# Patient Record
Sex: MALE | Race: WHITE | NOT HISPANIC OR LATINO | Employment: OTHER | URBAN - METROPOLITAN AREA
[De-identification: names, ages, dates, MRNs, and addresses within clinical notes are randomized per-mention and may not be internally consistent; named-entity substitution may affect disease eponyms.]

---

## 2024-08-09 ENCOUNTER — HOSPITAL ENCOUNTER (EMERGENCY)
Facility: HOSPITAL | Age: 54
Discharge: HOME | End: 2024-08-09
Attending: EMERGENCY MEDICINE | Admitting: EMERGENCY MEDICINE
Payer: COMMERCIAL

## 2024-08-09 ENCOUNTER — APPOINTMENT (OUTPATIENT)
Dept: RADIOLOGY | Facility: HOSPITAL | Age: 54
End: 2024-08-09
Payer: COMMERCIAL

## 2024-08-09 VITALS
BODY MASS INDEX: 29.52 KG/M2 | WEIGHT: 230 LBS | OXYGEN SATURATION: 96 % | DIASTOLIC BLOOD PRESSURE: 84 MMHG | HEART RATE: 62 BPM | RESPIRATION RATE: 18 BRPM | TEMPERATURE: 98.7 F | SYSTOLIC BLOOD PRESSURE: 162 MMHG | HEIGHT: 74 IN

## 2024-08-09 DIAGNOSIS — T14.8XXA OPEN FRACTURE: ICD-10-CM

## 2024-08-09 LAB
ABO/RH(D): NORMAL
ANION GAP SERPL CALCULATED.3IONS-SCNC: 11 MMOL/L (ref 7–15)
ANTIBODY SCREEN: NEGATIVE
BASOPHILS # BLD AUTO: 0.1 10E3/UL (ref 0–0.2)
BASOPHILS NFR BLD AUTO: 1 %
BUN SERPL-MCNC: 28.9 MG/DL (ref 6–20)
CALCIUM SERPL-MCNC: 8.8 MG/DL (ref 8.8–10.4)
CHLORIDE SERPL-SCNC: 105 MMOL/L (ref 98–107)
CREAT SERPL-MCNC: 1.13 MG/DL (ref 0.67–1.17)
EGFRCR SERPLBLD CKD-EPI 2021: 77 ML/MIN/1.73M2
EOSINOPHIL # BLD AUTO: 0.3 10E3/UL (ref 0–0.7)
EOSINOPHIL NFR BLD AUTO: 5 %
ERYTHROCYTE [DISTWIDTH] IN BLOOD BY AUTOMATED COUNT: 12.7 % (ref 10–15)
GLUCOSE SERPL-MCNC: 104 MG/DL (ref 70–99)
HCO3 SERPL-SCNC: 27 MMOL/L (ref 22–29)
HCT VFR BLD AUTO: 41.1 % (ref 40–53)
HGB BLD-MCNC: 14.7 G/DL (ref 13.3–17.7)
IMM GRANULOCYTES # BLD: 0 10E3/UL
IMM GRANULOCYTES NFR BLD: 0 %
LYMPHOCYTES # BLD AUTO: 1.8 10E3/UL (ref 0.8–5.3)
LYMPHOCYTES NFR BLD AUTO: 27 %
MCH RBC QN AUTO: 31.5 PG (ref 26.5–33)
MCHC RBC AUTO-ENTMCNC: 35.8 G/DL (ref 31.5–36.5)
MCV RBC AUTO: 88 FL (ref 78–100)
MONOCYTES # BLD AUTO: 0.4 10E3/UL (ref 0–1.3)
MONOCYTES NFR BLD AUTO: 5 %
NEUTROPHILS # BLD AUTO: 4.1 10E3/UL (ref 1.6–8.3)
NEUTROPHILS NFR BLD AUTO: 62 %
NRBC # BLD AUTO: 0 10E3/UL
NRBC BLD AUTO-RTO: 0 /100
PLATELET # BLD AUTO: 209 10E3/UL (ref 150–450)
POTASSIUM SERPL-SCNC: 3.8 MMOL/L (ref 3.4–5.3)
RBC # BLD AUTO: 4.66 10E6/UL (ref 4.4–5.9)
SODIUM SERPL-SCNC: 143 MMOL/L (ref 135–145)
SPECIMEN EXPIRATION DATE: NORMAL
WBC # BLD AUTO: 6.7 10E3/UL (ref 4–11)

## 2024-08-09 PROCEDURE — 96375 TX/PRO/DX INJ NEW DRUG ADDON: CPT

## 2024-08-09 PROCEDURE — 80048 BASIC METABOLIC PNL TOTAL CA: CPT

## 2024-08-09 PROCEDURE — 96376 TX/PRO/DX INJ SAME DRUG ADON: CPT

## 2024-08-09 PROCEDURE — 36415 COLL VENOUS BLD VENIPUNCTURE: CPT | Performed by: EMERGENCY MEDICINE

## 2024-08-09 PROCEDURE — 12004 RPR S/N/AX/GEN/TRK7.6-12.5CM: CPT

## 2024-08-09 PROCEDURE — 73130 X-RAY EXAM OF HAND: CPT | Mod: RT

## 2024-08-09 PROCEDURE — 99284 EMERGENCY DEPT VISIT MOD MDM: CPT | Mod: 25

## 2024-08-09 PROCEDURE — 90471 IMMUNIZATION ADMIN: CPT

## 2024-08-09 PROCEDURE — 96365 THER/PROPH/DIAG IV INF INIT: CPT

## 2024-08-09 PROCEDURE — 86900 BLOOD TYPING SEROLOGIC ABO: CPT | Performed by: EMERGENCY MEDICINE

## 2024-08-09 PROCEDURE — 85025 COMPLETE CBC W/AUTO DIFF WBC: CPT

## 2024-08-09 PROCEDURE — 26720 TREAT FINGER FRACTURE EACH: CPT | Mod: F7

## 2024-08-09 PROCEDURE — 36415 COLL VENOUS BLD VENIPUNCTURE: CPT

## 2024-08-09 PROCEDURE — 250N000011 HC RX IP 250 OP 636

## 2024-08-09 PROCEDURE — 90715 TDAP VACCINE 7 YRS/> IM: CPT

## 2024-08-09 RX ORDER — HYDROCODONE BITARTRATE AND ACETAMINOPHEN 5; 325 MG/1; MG/1
1 TABLET ORAL EVERY 6 HOURS PRN
Qty: 12 TABLET | Refills: 0 | Status: SHIPPED | OUTPATIENT
Start: 2024-08-09 | End: 2024-08-12

## 2024-08-09 RX ORDER — HYDROMORPHONE HYDROCHLORIDE 1 MG/ML
0.5 INJECTION, SOLUTION INTRAMUSCULAR; INTRAVENOUS; SUBCUTANEOUS ONCE
Status: COMPLETED | OUTPATIENT
Start: 2024-08-09 | End: 2024-08-09

## 2024-08-09 RX ORDER — CEFAZOLIN SODIUM 2 G/100ML
2 INJECTION, SOLUTION INTRAVENOUS ONCE
Status: COMPLETED | OUTPATIENT
Start: 2024-08-09 | End: 2024-08-09

## 2024-08-09 RX ORDER — CEPHALEXIN 500 MG/1
500 CAPSULE ORAL 4 TIMES DAILY
Qty: 28 CAPSULE | Refills: 0 | Status: SHIPPED | OUTPATIENT
Start: 2024-08-09 | End: 2024-08-16

## 2024-08-09 RX ADMIN — CLOSTRIDIUM TETANI TOXOID ANTIGEN (FORMALDEHYDE INACTIVATED), CORYNEBACTERIUM DIPHTHERIAE TOXOID ANTIGEN (FORMALDEHYDE INACTIVATED), BORDETELLA PERTUSSIS TOXOID ANTIGEN (GLUTARALDEHYDE INACTIVATED), BORDETELLA PERTUSSIS FILAMENTOUS HEMAGGLUTININ ANTIGEN (FORMALDEHYDE INACTIVATED), BORDETELLA PERTUSSIS PERTACTIN ANTIGEN, AND BORDETELLA PERTUSSIS FIMBRIAE 2/3 ANTIGEN 0.5 ML: 5; 2; 2.5; 5; 3; 5 INJECTION, SUSPENSION INTRAMUSCULAR at 16:58

## 2024-08-09 RX ADMIN — CEFAZOLIN SODIUM 2 G: 2 INJECTION, SOLUTION INTRAVENOUS at 18:30

## 2024-08-09 RX ADMIN — HYDROMORPHONE HYDROCHLORIDE 0.5 MG: 1 INJECTION, SOLUTION INTRAMUSCULAR; INTRAVENOUS; SUBCUTANEOUS at 16:45

## 2024-08-09 RX ADMIN — HYDROMORPHONE HYDROCHLORIDE 0.5 MG: 1 INJECTION, SOLUTION INTRAMUSCULAR; INTRAVENOUS; SUBCUTANEOUS at 18:08

## 2024-08-09 ASSESSMENT — COLUMBIA-SUICIDE SEVERITY RATING SCALE - C-SSRS
6. HAVE YOU EVER DONE ANYTHING, STARTED TO DO ANYTHING, OR PREPARED TO DO ANYTHING TO END YOUR LIFE?: NO
1. IN THE PAST MONTH, HAVE YOU WISHED YOU WERE DEAD OR WISHED YOU COULD GO TO SLEEP AND NOT WAKE UP?: NO
2. HAVE YOU ACTUALLY HAD ANY THOUGHTS OF KILLING YOURSELF IN THE PAST MONTH?: NO

## 2024-08-09 ASSESSMENT — ACTIVITIES OF DAILY LIVING (ADL)
ADLS_ACUITY_SCORE: 39
ADLS_ACUITY_SCORE: 39
ADLS_ACUITY_SCORE: 35
ADLS_ACUITY_SCORE: 39

## 2024-08-09 NOTE — ED PROVIDER NOTES
Emergency Department Staff Note  I had a face to face encounter with this patient seen by the Advanced Practice Provider (MYCHAL).  I personally made/approved the management plan and take responsibility for the patient management. I personally saw the patient and performed a substantive portion of the visit including all aspects of the medical decision making.      ED Course as of 08/09/24 1958   Fri Aug 09, 2024   1642 Patient is a 54-year-old male who comes in today after he excellently cut his right hand using a saw.  He said he had just finished cutting the wood and the blade had not stopped and the guard had gone down and he reached and cut across the MTP joints of his second and third digits.  You can see the joint capsule in there.  He has difficulty extending his right index finger.  Bleeding is controlled now.  He thinks his tetanus shot is about 7 years old.  Last ate at 2 PM.  You can see the picture in the chart but I think ultimately patient will need to be taken to the OR by orthopedics with this type of injury for washout and further evaluation of tendon injuries.  He will need a dose of IV antibiotics prior to going and we will update his tetanus.  I discussed the plan with the patient and he is in agreement.   1819 I reviewed the x-ray.  There is a little fragment which I suspect is bone from him cutting with a saw.   1911 I discussed the case with Dr. Jett with [facility].  She feels that after reviewing the films and the x-ray on the patient it would be reasonable to just close the skin and splinted him in extension and then have him follow-up on Monday where they will go in and do the tendon repair.  She said the soonest she could get him to the operating room here would be tomorrow afternoon and she did not feel like the 36 hours from tomorrow afternoon to Monday would make much of a difference.  Patient has gotten a dose of IV antibiotics here and will go home on antibiotics.  I think that is  reasonable if we can get his pain under control.   1958 Able to successfully close the laceration.  Tami held it and I did the suturing.  We used 12 sutures and it did close quite nicely.  We discussed the plan with the patient and his wife and everybody is in agreement.       I independently interpreted the following study(s): Hand x-ray which showed a small fracture fragment.  See full radiology report for all details.    1910 I discussed the patient with Dr. Jett, who recommends closure here with follow-up on Monday,.    PROCEDURE: Laceration Repair   INDICATIONS: Laceration   PROCEDURE PROVIDER: Dr Patito Darden   SITE: Right dorsal hand   TYPE/SIZE: complex, subcutaneous, and ragged edges  12 cm (total length)   FUNCTIONAL ASSESSMENT: Distal sensation and circulation intact   MEDICATION: 5 mLs of 1% Lidocaine with epinephrine   PREPARATION: irrigation with Normal saline   DEBRIDEMENT: wound explored, no foreign body found   CLOSURE:  Superficial layer closed with 12 stitches of 4-0 Prolene simple interrupted    Total number of sutures/staples placed: 12           [facility] EMERGENCY DEPARTMENT  MD Katalina Ramirez, Patito Rendon MD  08/09/24 7125

## 2024-08-09 NOTE — ED NOTES
"[facility] ED Handoff Report    ED Chief Complaint: Th pt is a 54 year old male that was using a saw and cut his right hand 7 cm laceration down to the bone, bleeding is controlled. Pt has numbness and tingling the the fingers, pain controlled with dilaudid. Pulses present.    ED Diagnosis:  (T14.8XXA) Open fracture  Comment: 7 cm laceration that extends over his second and third fingers. Laceration is just distal to the second and third MCP joints. Decreased extension and strength of the second digit. Remainder of digits have normal range of motion and strength. Normal capillary refill throughout.   Plan: admission       PMH:  No past medical history on file.     Code Status:  No Order     Falls Risk: No Band: Not applicable    Current Living Situation/Residence: lives with a significant other     Elimination Status: Continent: Yes     Activity Level: Independent    Patients Preferred Language:  English     Needed: No    Vital Signs:  BP (!) 156/101   Pulse 64   Temp 98.7  F (37.1  C) (Temporal)   Resp 16   Ht 1.88 m (6' 2\")   Wt 104.3 kg (230 lb)   SpO2 97%   BMI 29.53 kg/m       Cardiac Rhythm: NA    Pain Score: 4/10, controlled with pain medication    Is the Patient Confused:  No    Last Food or Drink: 08/09/24 prior to arrival to the ED    Focused Assessment:  Alert and oriented x 4, pleasant and cooperative, ambulates independently, right hand bleeding is controlled, tingling present. Able to use bedside urinal    Tests Performed: Done: Labs and Imaging    Treatments Provided:  Pain medication, abx    Family Dynamics/Concerns: No    Family Updated On Visitor Policy: No    Plan of Care Communicated to Family: Yes    Who Was Updated about Plan of Care: The patients wife    Belongings Checklist Done and Signed by Patient: Yes    Belongings Sent with Patient: Cell phone, clothing, pt wife taking wallet home    Medications sent with patient: None    Covid: asymptomatic , " none    Additional Information:     RN: Ketty Bailey RN 8/9/2024 6:58 PM

## 2024-08-09 NOTE — ED PROVIDER NOTES
EMERGENCY DEPARTMENT ENCOUNTER      NAME: Kasandra Fletcher  AGE: 54 year old male  YOB: 1970  MRN: [id number]  EVALUATION DATE & TIME: 8/9/2024  4:23 PM    PCP: No primary care provider on file.    ED PROVIDER: Tami Aleman PA-C      Chief Complaint   Patient presents with    Laceration     FINAL IMPRESSION:  1. Open fracture        ED COURSE & MEDICAL DECISION MAKING:    Pertinent Labs & Imaging studies reviewed. (See chart for details)  54 year old male presents to the Emergency Department for evaluation of laceration.  Just prior to arrival patient was using a saw when he accidentally cut his right hand.  Patient is right-handed.  Laceration just distal to the second and third MCP joints.  Patient last ate and drank at 2 PM.  Vital signs reviewed and patient is hypertensive most likely secondary to pain.  Afebrile.  On exam patient has a 14 cm laceration that extends over his second and third fingers.  Laceration is just distal to the second and third MCP joints.  Decreased extension and strength of the second digit.  Remainder of digits have normal range of motion and strength.  Normal capillary refill throughout.  Pulses are 2+ bilaterally.  No overlying erythema, edema, ecchymosis.  No active bleeding.    Tetanus was updated..  X-ray of the hand shows on the lateral view there is a linear lucency which projects over the dorsal cortex of the proximal phalanx of the middle finger which is concerning for a direct incomplete bony injury/fracture from the laceration.  Triangular 4 mm density with adjacent soft tissue gas of the soft tissue along the dorsal and ulnar aspects of the index finger MCP joint.  My attending felt that this was a bone fragment.  We discussed the x-ray results and the 4 mm density with the patient.  Patient feels that the density is most likely his bone.  Wound was thoroughly irrigated with sterile water.  I spoke with Dr. Jett from [facility] who recommended IV Unasyn,  washing out the wound thoroughly and closing the wound.  Dr. Jett will plan to take the patient to the OR on Monday morning as she does not feel patient needs to be admitted at this time.  Patient received multiple doses of Dilaudid here in the emergency room.  We did discuss possibly admitting the patient for pain management but we were able to get his pain under control after closing the wound and splinting him in extension. Dr. Darden placed 12 sutures. Patient will be discharged home with Norco as well as Keflex.  Patient was educated on his prescribed medications.  Patient was given strict return precautions. Patient agrees with plan.  All questions answered.  Patient will follow-up with Dr. Jett from [facility] as soon as possible.  Patient will return to the ED if new symptoms develop or symptoms worsen. Wound care was discussed.       ED COURSE:   4:33 PM I saw the patient. Staffed with Dr. Darden.   8:12 PM Dr. Darden closed the wound.  Patient was educated on his results and treatment plan.  Patient agrees with plan.  All questions answered.  ED Course as of 08/09/24 8179   Fri Aug 09, 2024   8642 Patient is a 54-year-old male who comes in today after he excellently cut his right hand using a saw.  He said he had just finished cutting the wood and the blade had not stopped and the guard had gone down and he reached and cut across the MTP joints of his second and third digits.  You can see the joint capsule in there.  He has difficulty extending his right index finger.  Bleeding is controlled now.  He thinks his tetanus shot is about 7 years old.  Last ate at 2 PM.  You can see the picture in the chart but I think ultimately patient will need to be taken to the OR by orthopedics with this type of injury for washout and further evaluation of tendon injuries.  He will need a dose of IV antibiotics prior to going and we will update his tetanus.  I discussed the plan with the patient and he is in  agreement.   1819 I reviewed the x-ray.  There is a little fragment which I suspect is bone from him cutting with a saw.   1911 I discussed the case with Dr. Jett with [facility].  She feels that after reviewing the films and the x-ray on the patient it would be reasonable to just close the skin and splinted him in extension and then have him follow-up on Monday where they will go in and do the tendon repair.  She said the soonest she could get him to the operating room here would be tomorrow afternoon and she did not feel like the 36 hours from tomorrow afternoon to Monday would make much of a difference.  Patient has gotten a dose of IV antibiotics here and will go home on antibiotics.  I think that is reasonable if we can get his pain under control.   1958 Able to successfully close the laceration.  Tami held it and I did the suturing.  We used 12 sutures and it did close quite nicely.  We discussed the plan with the patient and his wife and everybody is in agreement.     5:01 PM I consulted with Dr. Jett, [facility], and they recommended IV antibiotics.  6:10 PM I revisited patient. I washed out and cleaned his wound.  6:32 PM I consulted patient with hospitalist.   7:17 PM Dr. Darden closed the wound.  We discussed patient's results and patient be discharged home.  Patient present plan.  All questions answered.    At the conclusion of the encounter I discussed the results of all of the tests and the disposition. The questions were answered. The patient or family acknowledged understanding and was agreeable with the care plan.     0 minutes of critical care time       Medical Decision Making  Obtained supplemental history:Supplemental history obtained?: No  Reviewed external records: External records reviewed?: No  Care impacted by chronic illness:N/A  Care significantly affected by social determinants of health:N/A  Did you consider but not order tests?: Work up considered but not performed and  documented in chart, if applicable  Did you interpret images independently?: Independent interpretation of ECG and images noted in documentation, when applicable.  Consultation discussion with other provider:Did you involve another provider (consultant, , pharmacy, etc.)?: I discussed the care with another health care provider, see documentation for details.  Discharge. I prescribed additional prescription strength medication(s) as charted. See documentation for any additional details.      MEDICATIONS GIVEN IN THE EMERGENCY:  Medications   Tdap (tetanus-diphtheria-acell pertussis) (ADACEL) injection 0.5 mL (0.5 mLs Intramuscular $Given 8/9/24 1658)   HYDROmorphone (PF) (DILAUDID) injection 0.5 mg (0.5 mg Intravenous $Given 8/9/24 1645)   ceFAZolin (ANCEF) 2 g in 100 mL D5W intermittent infusion (0 g Intravenous Stopped 8/9/24 2003)   HYDROmorphone (PF) (DILAUDID) injection 0.5 mg (0.5 mg Intravenous $Given 8/9/24 1808)       NEW PRESCRIPTIONS STARTED AT TODAY'S ER VISIT  Discharge Medication List as of 8/9/2024  8:12 PM        START taking these medications    Details   cephALEXin (KEFLEX) 500 MG capsule Take 1 capsule (500 mg) by mouth 4 times daily for 7 days, Disp-28 capsule, R-0, Local Print      HYDROcodone-acetaminophen (NORCO) 5-325 MG tablet Take 1 tablet by mouth every 6 hours as needed for severe pain, Disp-12 tablet, R-0, Local Print                =================================================================    HPI    Patient information was obtained from: patient    Use of : N/A        Kasandra Fletcher is a 54 year old male with a no past pertinent medical history who presents to this ED walk in for evaluation of laceration.    Approximately 1 hour ago, patient was using his saw and hit his right hand. He cut his forefinger and middle finger. According to him, he has limited range of motion with his forefinger. His pain is at a 5-6/10, and it is increasing. Patient's last tetanus shot was  "in 2016.    He denies all other complains.       REVIEW OF SYSTEMS   As per HPI    PAST MEDICAL HISTORY:  No past medical history on file.    PAST SURGICAL HISTORY:  No past surgical history on file.        CURRENT MEDICATIONS:    Ascorbic Acid (VITAMIN C PO)  ASTAXANTHIN PO  cephALEXin (KEFLEX) 500 MG capsule  Cholecalciferol (VITAMIN D3 PO)  HYDROcodone-acetaminophen (NORCO) 5-325 MG tablet  TURMERIC PO        ALLERGIES:  No Known Allergies    FAMILY HISTORY:  No family history on file.    SOCIAL HISTORY:   Social History     Socioeconomic History    Marital status:        VITALS:  BP (!) 162/84   Pulse 62   Temp 98.7  F (37.1  C) (Temporal)   Resp 18   Ht 1.88 m (6' 2\")   Wt 104.3 kg (230 lb)   SpO2 96%   BMI 29.53 kg/m      PHYSICAL EXAM    Physical Exam  Vitals and nursing note reviewed.   Constitutional:       Appearance: Normal appearance.   HENT:      Head: Atraumatic.      Right Ear: External ear normal.      Left Ear: External ear normal.      Nose: Nose normal.      Mouth/Throat:      Mouth: Mucous membranes are moist.   Eyes:      Conjunctiva/sclera: Conjunctivae normal.      Pupils: Pupils are equal, round, and reactive to light.   Cardiovascular:      Rate and Rhythm: Normal rate and regular rhythm.      Pulses: Normal pulses.      Heart sounds: Normal heart sounds. No murmur heard.     No friction rub. No gallop.   Pulmonary:      Effort: Pulmonary effort is normal.      Breath sounds: Normal breath sounds. No wheezing or rales.   Abdominal:      Tenderness: There is no abdominal tenderness. There is no guarding or rebound.   Musculoskeletal:      Cervical back: Normal range of motion.      Comments: 14 cm laceration on the dorsal aspect of the right hand.  Laceration goes over the second and third digits just distal to the MCP joint.  Right hand is tender to palpation.  Right second digit has decreased range of motion and strength secondary due to pain.  Right second digit has normal " sensation.  Remainder of digits have normal range of motion, sensation and strength.  Pulses are 2+ bilaterally.  Normal capillary refill.  No surrounding erythema, edema, ecchymosis.  No warmth.  No active bleeding.   Skin:     General: Skin is dry.   Neurological:      General: No focal deficit present.      Mental Status: He is alert. Mental status is at baseline.   Psychiatric:         Mood and Affect: Mood normal.         Thought Content: Thought content normal.               LAB:  All pertinent labs reviewed and interpreted.  Labs Ordered and Resulted from Time of ED Arrival to Time of ED Departure   BASIC METABOLIC PANEL - Abnormal       Result Value    Sodium 143      Potassium 3.8      Chloride 105      Carbon Dioxide (CO2) 27      Anion Gap 11      Urea Nitrogen 28.9 (*)     Creatinine 1.13      GFR Estimate 77      Calcium 8.8      Glucose 104 (*)    CBC WITH PLATELETS AND DIFFERENTIAL    WBC Count 6.7      RBC Count 4.66      Hemoglobin 14.7      Hematocrit 41.1      MCV 88      MCH 31.5      MCHC 35.8      RDW 12.7      Platelet Count 209      % Neutrophils 62      % Lymphocytes 27      % Monocytes 5      % Eosinophils 5      % Basophils 1      % Immature Granulocytes 0      NRBCs per 100 WBC 0      Absolute Neutrophils 4.1      Absolute Lymphocytes 1.8      Absolute Monocytes 0.4      Absolute Eosinophils 0.3      Absolute Basophils 0.1      Absolute Immature Granulocytes 0.0      Absolute NRBCs 0.0     TYPE AND SCREEN, ADULT    ABO/RH(D) A POS      Antibody Screen Negative      SPECIMEN EXPIRATION DATE [id number]     ABO/RH TYPE AND SCREEN        RADIOLOGY:  Reviewed all pertinent imaging. Please see official radiology report.  XR Hand Right G/E 3 Views   Final Result   IMPRESSION:  On the lateral view, there is a linear lucency which projects over the dorsal cortex of the proximal phalanx of the middle finger which is concerning for a direct incomplete bony injury/fracture from the laceration.  There is no evidence of    an acute      Triangular 4 mm density with adjacent soft tissue gas and the soft tissues along the dorsal and ulnar aspects of the index finger MCP joint, likely reflecting a small radiopaque foreign body.      NOTE: ABNORMAL REPORT      THE DICTATION ABOVE DESCRIBES AN ABNORMALITY FOR WHICH FOLLOW-UP IS NEEDED.         PROCEDURES:  See Dr. Darden's Note.     I, Ruthann Sung, am serving as a scribe to document services personally performed by Tami Aleman PA-C, based on my observation and the provider's statements to me. I, Tami Aleman PA-C, attest that Ruthann Sung is acting in a scribe capacity, has observed my performance of the services and has documented them in accordance with my direction.    Tami Aleman PA-C  [facility] EMERGENCY DEPARTMENT  [address] [place] [id number]  [phone]       Tami Aleman PA-C  08/11/24 1600

## 2024-08-09 NOTE — TREATMENT PLAN
Called about this patient, Kasandra Fletcher. In brief, 54 year old M with right dorsal index and middle finger lacerations. Clear tendon injury and open MCP injury. XR show nondisplaced MF P1 fx, avulsion fx of 2nd MCP. No gross contamination.  Recommend local I&D in the ER, IV abx and closure with nylon. Last tetanus 7yrs ago.   Will call and plan for I&D on Monday.     Judy Jett MD   08/09/24   7:12 PM     I called the patient yesterday to schedule him for surgery early tomorrow morning.  I then got a message later in the day saying that he is going back to a surgeon that he had previously been treated by.  I attempted to call him again this morning and there was no answer.    Judy Jett MD   08/11/24   10:45 AM

## 2024-08-09 NOTE — ED TRIAGE NOTES
Patient states prior to arrival he was using a miter saw and caused a laceration to his right hand, emphasis to 2nd and 3rd digits.  Pressure dressing applied, up to date on tetanus. CMS intact.  Bleeding controlled.      Triage Assessment (Adult)       Row Name 08/09/24 1625          Triage Assessment    Airway WDL WDL        Respiratory WDL    Respiratory WDL WDL        Skin Circulation/Temperature WDL    Skin Circulation/Temperature WDL WDL        Cardiac WDL    Cardiac WDL WDL        Peripheral/Neurovascular WDL    Peripheral Neurovascular WDL WDL        Cognitive/Neuro/Behavioral WDL    Cognitive/Neuro/Behavioral WDL WDL        Randi Coma Scale    Best Eye Response 4-->(E4) spontaneous     Best Motor Response 6-->(M6) obeys commands     Best Verbal Response 5-->(V5) oriented     [place] Coma Scale Score 15

## 2024-08-10 NOTE — DISCHARGE INSTRUCTIONS
Keep your wound clean and dry.  Follow-up with Dr. Jett at [facility] to discuss your hand injury.  Return to the ED if new symptoms develop or symptoms worsen.  You were prescribed Norco which is a pain medication. You can take Norco every 6 hours for your pain. You were also prescribed antibiotics called Keflex.  Please take your Keflex 4 times a day for the next 7 days.

## 2024-08-10 NOTE — MEDICATION SCRIBE - ADMISSION MEDICATION HISTORY
Medication Scribe Admission Medication History    Admission medication history is complete. The information provided in this note is only as accurate as the sources available at the time of the update.    Information Source(s): Patient via in-person    Pertinent Information: patient reports self management of medications. Prior to interview, patient profile contained zero medications.    Changes made to PTA medication list:  Added: All  Deleted: None  Changed: None    Allergies reviewed with patient and updates made in EHR: yes    Medication History Completed By: Sebas Barrett 8/9/2024 7:07 PM    PTA Med List   Medication Sig Last Dose    Ascorbic Acid (VITAMIN C PO) Take 1 tablet by mouth daily 8/9/2024 at am    ASTAXANTHIN PO Take 1 tablet by mouth daily 8/9/2024 at am    Cholecalciferol (VITAMIN D3 PO) Take 1 tablet by mouth daily 8/9/2024 at am    TURMERIC PO Take 1 tablet by mouth daily 8/9/2024 at am

## 2024-12-21 ENCOUNTER — HEALTH MAINTENANCE LETTER (OUTPATIENT)
Age: 54
End: 2024-12-21